# Patient Record
Sex: FEMALE | Race: WHITE | URBAN - METROPOLITAN AREA
[De-identification: names, ages, dates, MRNs, and addresses within clinical notes are randomized per-mention and may not be internally consistent; named-entity substitution may affect disease eponyms.]

---

## 2024-08-27 ENCOUNTER — OFFICE VISIT (OUTPATIENT)
Dept: URGENT CARE | Facility: CLINIC | Age: 24
End: 2024-08-27

## 2024-08-27 VITALS
DIASTOLIC BLOOD PRESSURE: 81 MMHG | WEIGHT: 158 LBS | BODY MASS INDEX: 26.98 KG/M2 | HEART RATE: 104 BPM | HEIGHT: 64 IN | SYSTOLIC BLOOD PRESSURE: 116 MMHG | RESPIRATION RATE: 18 BRPM | TEMPERATURE: 100.8 F | OXYGEN SATURATION: 98 %

## 2024-08-27 DIAGNOSIS — J02.0 STREP THROAT: Primary | ICD-10-CM

## 2024-08-27 LAB — S PYO AG THROAT QL: POSITIVE

## 2024-08-27 RX ORDER — AMOXICILLIN 500 MG/1
500 TABLET, FILM COATED ORAL 2 TIMES DAILY
Qty: 20 TABLET | Refills: 0 | Status: SHIPPED | OUTPATIENT
Start: 2024-08-27 | End: 2024-09-06

## 2024-08-27 NOTE — PROGRESS NOTES
[facility]        NAME: Ghazal Davila is a 24 y.o. female  : 2000    MRN: [id number]  DATE: 2024  TIME: 5:23 PM    Assessment and Plan   Strep throat [J02.0]  1. Strep throat  POCT rapid ANTIGEN strepA        Rapid strep positive in the office.     Patient Instructions     Take antibiotics as prescribed  Replace toothbrush after 2-3 days of treatment  Fluids and rest  Salt water gargles and chloraseptic spray  Warm tea with honey  Wash hands frequently  Don't share drinks  Tylenol/Ibuprofen for pain/fever     Follow up with PCP in 3-5 days.  Proceed to the ER with worsening symptoms.     If tests are performed, our office will contact you with results only if changes need to made to the care plan discussed with you at the visit. You can review your full results on [facility].    Chief Complaint     Chief Complaint   Patient presents with    Cold Like Symptoms     Pt states she has a sore throat on and off for 2 months, yesterday she had nausea, and body aches, and cough. Pt took 2 tylenol.          History of Present Illness       Sore throat has been intermittent for a few months.  Took some Augmentin that her friend had which improved symptoms but it came back, unsure how long the course was. Nausea, body aches, cough, and fever started last night.     URI   This is a new problem. The current episode started yesterday. The problem has been gradually worsening. The maximum temperature recorded prior to her arrival was 101 - 101.9 F. The fever has been present for 1 to 2 days. Associated symptoms include coughing, nausea and a sore throat (intermittent for 2 months).       Review of Systems   Review of Systems   Constitutional:  Positive for fever.   HENT:  Positive for sore throat (intermittent for 2 months).    Respiratory:  Positive for cough.    Gastrointestinal:  Positive for nausea.   Musculoskeletal:  Positive for myalgias.         Current Medications     No current  "outpatient medications on file.    Current Allergies     Allergies as of 08/27/2024    (No Known Allergies)            The following portions of the patient's history were reviewed and updated as appropriate: allergies, current medications, past family history, past medical history, past social history, past surgical history and problem list.     History reviewed. No pertinent past medical history.    History reviewed. No pertinent surgical history.    Family History   Problem Relation Age of Onset    Diabetes Father          Medications have been verified.        Objective   /81   Pulse 104   Temp (!) 100.8 °F (38.2 °C)   Resp 18   Ht 5' 4\" (1.626 m)   Wt 71.7 kg (158 lb)   SpO2 98%   BMI 27.12 kg/m²        Physical Exam     Physical Exam  Constitutional:       General: She is not in acute distress.     Appearance: She is not ill-appearing.   HENT:      Nose: Nose normal.      Mouth/Throat:      Mouth: Mucous membranes are moist.      Pharynx: Oropharynx is clear. Posterior oropharyngeal erythema present.      Tonsils: Tonsillar exudate present. 3+ on the right. 3+ on the left.   Eyes:      Pupils: Pupils are equal, round, and reactive to light.   Cardiovascular:      Rate and Rhythm: Normal rate and regular rhythm.      Pulses: Normal pulses.      Heart sounds: Normal heart sounds. No murmur heard.     No gallop.   Pulmonary:      Effort: Pulmonary effort is normal. No respiratory distress.      Breath sounds: Normal breath sounds. No wheezing.   Abdominal:      General: Abdomen is flat. Bowel sounds are normal. There is no distension.      Palpations: Abdomen is soft.      Tenderness: There is no abdominal tenderness.   Musculoskeletal:         General: Normal range of motion.      Cervical back: Normal range of motion.   Lymphadenopathy:      Cervical: Cervical adenopathy present.   Skin:     General: Skin is warm and dry.      Capillary Refill: Capillary refill takes less than 2 seconds. "   Neurological:      Mental Status: She is alert and oriented to person, place, and time.